# Patient Record
Sex: MALE | Race: WHITE | NOT HISPANIC OR LATINO | URBAN - METROPOLITAN AREA
[De-identification: names, ages, dates, MRNs, and addresses within clinical notes are randomized per-mention and may not be internally consistent; named-entity substitution may affect disease eponyms.]

---

## 2023-06-19 LAB
ABO GROUP (TYPE) IN BLOOD: NORMAL
ACTIVATED PARTIAL THROMBOPLASTIN TIME IN PPP BY COAGULATION ASSAY: 30 SEC (ref 26–39)
ANION GAP IN SER/PLAS: 11 MMOL/L (ref 10–20)
ANTIBODY SCREEN: NORMAL
BASOPHILS (10*3/UL) IN BLOOD BY AUTOMATED COUNT: 0.03 X10E9/L (ref 0–0.1)
BASOPHILS/100 LEUKOCYTES IN BLOOD BY AUTOMATED COUNT: 0.5 % (ref 0–2)
CALCIUM (MG/DL) IN SER/PLAS: 9.2 MG/DL (ref 8.6–10.3)
CARBON DIOXIDE, TOTAL (MMOL/L) IN SER/PLAS: 26 MMOL/L (ref 21–32)
CHLORIDE (MMOL/L) IN SER/PLAS: 101 MMOL/L (ref 98–107)
CREATININE (MG/DL) IN SER/PLAS: 0.95 MG/DL (ref 0.5–1.3)
EOSINOPHILS (10*3/UL) IN BLOOD BY AUTOMATED COUNT: 0.13 X10E9/L (ref 0–0.7)
EOSINOPHILS/100 LEUKOCYTES IN BLOOD BY AUTOMATED COUNT: 2 % (ref 0–6)
ERYTHROCYTE DISTRIBUTION WIDTH (RATIO) BY AUTOMATED COUNT: 12.6 % (ref 11.5–14.5)
ERYTHROCYTE MEAN CORPUSCULAR HEMOGLOBIN CONCENTRATION (G/DL) BY AUTOMATED: 33.5 G/DL (ref 32–36)
ERYTHROCYTE MEAN CORPUSCULAR VOLUME (FL) BY AUTOMATED COUNT: 89 FL (ref 80–100)
ERYTHROCYTES (10*6/UL) IN BLOOD BY AUTOMATED COUNT: 4.43 X10E12/L (ref 4.5–5.9)
GFR MALE: 87 ML/MIN/1.73M2
GLUCOSE (MG/DL) IN SER/PLAS: 90 MG/DL (ref 74–99)
HEMATOCRIT (%) IN BLOOD BY AUTOMATED COUNT: 39.4 % (ref 41–52)
HEMOGLOBIN (G/DL) IN BLOOD: 13.2 G/DL (ref 13.5–17.5)
IMMATURE GRANULOCYTES/100 LEUKOCYTES IN BLOOD BY AUTOMATED COUNT: 0.3 % (ref 0–0.9)
INR IN PPP BY COAGULATION ASSAY: 0.9 (ref 0.9–1.1)
LEUKOCYTES (10*3/UL) IN BLOOD BY AUTOMATED COUNT: 6.4 X10E9/L (ref 4.4–11.3)
LYMPHOCYTES (10*3/UL) IN BLOOD BY AUTOMATED COUNT: 2.67 X10E9/L (ref 1.2–4.8)
LYMPHOCYTES/100 LEUKOCYTES IN BLOOD BY AUTOMATED COUNT: 42 % (ref 13–44)
MONOCYTES (10*3/UL) IN BLOOD BY AUTOMATED COUNT: 0.62 X10E9/L (ref 0.1–1)
MONOCYTES/100 LEUKOCYTES IN BLOOD BY AUTOMATED COUNT: 9.7 % (ref 2–10)
NEUTROPHILS (10*3/UL) IN BLOOD BY AUTOMATED COUNT: 2.89 X10E9/L (ref 1.2–7.7)
NEUTROPHILS/100 LEUKOCYTES IN BLOOD BY AUTOMATED COUNT: 45.5 % (ref 40–80)
NRBC (PER 100 WBCS) BY AUTOMATED COUNT: 0 /100 WBC (ref 0–0)
PLATELETS (10*3/UL) IN BLOOD AUTOMATED COUNT: 242 X10E9/L (ref 150–450)
POTASSIUM (MMOL/L) IN SER/PLAS: 4 MMOL/L (ref 3.5–5.3)
PROTHROMBIN TIME (PT) IN PPP BY COAGULATION ASSAY: 10.9 SEC (ref 9.8–13.4)
RH FACTOR: NORMAL
SODIUM (MMOL/L) IN SER/PLAS: 134 MMOL/L (ref 136–145)
UREA NITROGEN (MG/DL) IN SER/PLAS: 19 MG/DL (ref 6–23)

## 2023-06-26 ENCOUNTER — HOSPITAL ENCOUNTER (OUTPATIENT)
Dept: DATA CONVERSION | Facility: HOSPITAL | Age: 68
End: 2023-06-27
Attending: ORTHOPAEDIC SURGERY | Admitting: ORTHOPAEDIC SURGERY

## 2023-06-26 DIAGNOSIS — E78.5 HYPERLIPIDEMIA, UNSPECIFIED: ICD-10-CM

## 2023-06-26 DIAGNOSIS — M17.12 UNILATERAL PRIMARY OSTEOARTHRITIS, LEFT KNEE: ICD-10-CM

## 2023-06-26 DIAGNOSIS — M25.762 OSTEOPHYTE, LEFT KNEE: ICD-10-CM

## 2023-06-26 DIAGNOSIS — J45.909 UNSPECIFIED ASTHMA, UNCOMPLICATED (HHS-HCC): ICD-10-CM

## 2023-06-27 LAB
ANION GAP IN SER/PLAS: 15 MMOL/L (ref 10–20)
BASOPHILS (10*3/UL) IN BLOOD BY AUTOMATED COUNT: 0.02 X10E9/L (ref 0–0.1)
BASOPHILS/100 LEUKOCYTES IN BLOOD BY AUTOMATED COUNT: 0.1 % (ref 0–2)
CALCIUM (MG/DL) IN SER/PLAS: 8.6 MG/DL (ref 8.6–10.3)
CARBON DIOXIDE, TOTAL (MMOL/L) IN SER/PLAS: 21 MMOL/L (ref 21–32)
CHLORIDE (MMOL/L) IN SER/PLAS: 105 MMOL/L (ref 98–107)
CREATININE (MG/DL) IN SER/PLAS: 0.89 MG/DL (ref 0.5–1.3)
EOSINOPHILS (10*3/UL) IN BLOOD BY AUTOMATED COUNT: 0 X10E9/L (ref 0–0.7)
EOSINOPHILS/100 LEUKOCYTES IN BLOOD BY AUTOMATED COUNT: 0 % (ref 0–6)
ERYTHROCYTE DISTRIBUTION WIDTH (RATIO) BY AUTOMATED COUNT: 12.8 % (ref 11.5–14.5)
ERYTHROCYTE MEAN CORPUSCULAR HEMOGLOBIN CONCENTRATION (G/DL) BY AUTOMATED: 34 G/DL (ref 32–36)
ERYTHROCYTE MEAN CORPUSCULAR VOLUME (FL) BY AUTOMATED COUNT: 89 FL (ref 80–100)
ERYTHROCYTES (10*6/UL) IN BLOOD BY AUTOMATED COUNT: 3.68 X10E12/L (ref 4.5–5.9)
GFR MALE: >90 ML/MIN/1.73M2
GLUCOSE (MG/DL) IN SER/PLAS: 130 MG/DL (ref 74–99)
HEMATOCRIT (%) IN BLOOD BY AUTOMATED COUNT: 32.9 % (ref 41–52)
HEMOGLOBIN (G/DL) IN BLOOD: 11.2 G/DL (ref 13.5–17.5)
IMMATURE GRANULOCYTES/100 LEUKOCYTES IN BLOOD BY AUTOMATED COUNT: 0.5 % (ref 0–0.9)
LEUKOCYTES (10*3/UL) IN BLOOD BY AUTOMATED COUNT: 16.8 X10E9/L (ref 4.4–11.3)
LYMPHOCYTES (10*3/UL) IN BLOOD BY AUTOMATED COUNT: 1.32 X10E9/L (ref 1.2–4.8)
LYMPHOCYTES/100 LEUKOCYTES IN BLOOD BY AUTOMATED COUNT: 7.9 % (ref 13–44)
MONOCYTES (10*3/UL) IN BLOOD BY AUTOMATED COUNT: 1.56 X10E9/L (ref 0.1–1)
MONOCYTES/100 LEUKOCYTES IN BLOOD BY AUTOMATED COUNT: 9.3 % (ref 2–10)
NEUTROPHILS (10*3/UL) IN BLOOD BY AUTOMATED COUNT: 13.82 X10E9/L (ref 1.2–7.7)
NEUTROPHILS/100 LEUKOCYTES IN BLOOD BY AUTOMATED COUNT: 82.2 % (ref 40–80)
NRBC (PER 100 WBCS) BY AUTOMATED COUNT: 0 /100 WBC (ref 0–0)
PLATELETS (10*3/UL) IN BLOOD AUTOMATED COUNT: 225 X10E9/L (ref 150–450)
POTASSIUM (MMOL/L) IN SER/PLAS: 4.2 MMOL/L (ref 3.5–5.3)
SODIUM (MMOL/L) IN SER/PLAS: 137 MMOL/L (ref 136–145)
UREA NITROGEN (MG/DL) IN SER/PLAS: 22 MG/DL (ref 6–23)

## 2023-06-28 LAB
ANION GAP IN SER/PLAS: NORMAL
BASOPHILS (10*3/UL) IN BLOOD BY AUTOMATED COUNT: NORMAL
BASOPHILS/100 LEUKOCYTES IN BLOOD BY AUTOMATED COUNT: NORMAL
CALCIUM (MG/DL) IN SER/PLAS: NORMAL
CARBON DIOXIDE, TOTAL (MMOL/L) IN SER/PLAS: NORMAL
CHLORIDE (MMOL/L) IN SER/PLAS: NORMAL
CREATININE (MG/DL) IN SER/PLAS: NORMAL
EOSINOPHILS (10*3/UL) IN BLOOD BY AUTOMATED COUNT: NORMAL
EOSINOPHILS/100 LEUKOCYTES IN BLOOD BY AUTOMATED COUNT: NORMAL
ERYTHROCYTE DISTRIBUTION WIDTH (RATIO) BY AUTOMATED COUNT: NORMAL
ERYTHROCYTE MEAN CORPUSCULAR HEMOGLOBIN CONCENTRATION (G/DL) BY AUTOMATED: NORMAL
ERYTHROCYTE MEAN CORPUSCULAR VOLUME (FL) BY AUTOMATED COUNT: NORMAL
ERYTHROCYTES (10*6/UL) IN BLOOD BY AUTOMATED COUNT: NORMAL
GFR FEMALE: NORMAL
GFR MALE: NORMAL
GLUCOSE (MG/DL) IN SER/PLAS: NORMAL
HEMATOCRIT (%) IN BLOOD BY AUTOMATED COUNT: NORMAL
HEMOGLOBIN (G/DL) IN BLOOD: NORMAL
IMMATURE GRANULOCYTES/100 LEUKOCYTES IN BLOOD BY AUTOMATED COUNT: NORMAL
LEUKOCYTES (10*3/UL) IN BLOOD BY AUTOMATED COUNT: NORMAL
LYMPHOCYTES (10*3/UL) IN BLOOD BY AUTOMATED COUNT: NORMAL
LYMPHOCYTES/100 LEUKOCYTES IN BLOOD BY AUTOMATED COUNT: NORMAL
MANUAL DIFFERENTIAL Y/N: NORMAL
MONOCYTES (10*3/UL) IN BLOOD BY AUTOMATED COUNT: NORMAL
MONOCYTES/100 LEUKOCYTES IN BLOOD BY AUTOMATED COUNT: NORMAL
NEUTROPHILS (10*3/UL) IN BLOOD BY AUTOMATED COUNT: NORMAL
NEUTROPHILS/100 LEUKOCYTES IN BLOOD BY AUTOMATED COUNT: NORMAL
NRBC (PER 100 WBCS) BY AUTOMATED COUNT: NORMAL
PLATELETS (10*3/UL) IN BLOOD AUTOMATED COUNT: NORMAL
POTASSIUM (MMOL/L) IN SER/PLAS: NORMAL
SODIUM (MMOL/L) IN SER/PLAS: NORMAL
UREA NITROGEN (MG/DL) IN SER/PLAS: NORMAL

## 2023-09-29 VITALS
HEIGHT: 74 IN | SYSTOLIC BLOOD PRESSURE: 116 MMHG | BODY MASS INDEX: 30.64 KG/M2 | DIASTOLIC BLOOD PRESSURE: 75 MMHG | HEART RATE: 66 BPM | WEIGHT: 238.76 LBS

## 2023-09-30 NOTE — H&P
History & Physical Reviewed:   I have reviewed the History and Physical dated:  19-Jun-2023   History and Physical reviewed and relevant findings noted. Patient examined to review pertinent physical  findings.: No significant changes   Home Medications Reviewed: no changes noted   Allergies Reviewed: no changes noted       ERAS (Enhanced Recovery After Surgery):  ·  ERAS Patient: yes   ·  CPM/PAT Utilization: yes   ·  Immunonutrition Recovery Drink Utilization: no   ·  Carbohydrate Supplement Drink Utilization: no     Consent:   COVID-19 Consent:  ·  COVID-19 Risk Consent Surgeon has reviewed key risks related to the risk of nina COVID-19 and if they contract COVID-19 what the risks are.       Electronic Signatures:  Dg Ramos)  (Signed 26-Jun-2023 07:45)   Authored: History & Physical Reviewed, ERAS, Consent,  Note Completion      Last Updated: 26-Jun-2023 07:45 by gD Ramos)

## 2023-09-30 NOTE — PROGRESS NOTES
Service: Orthopaedics     Subjective Data:   JING HORTON is a 68 year old Male who is Hospital Day # 2 and POD #1 for Left total knee arthroplasty.    Additional Information:    Mr. Horton is voiding without issues. He complains of moderate left knee discomfort this morning. He denies chest pain, shortness of breath, nausea.  He is looking  forward to getting up and working with therapy.    Objective Data:     Objective Information:      T   P  R  BP   MAP  SpO2   Value  36.4  82  18  125/50   80  95%  Date/Time 6/27 8:00 6/27 8:00 6/27 8:00 6/27 8:00  6/27 8:00 6/27 8:46  Range  (36C - 36.4C )  (68 - 82 )  (18 - 18 )  (125 - 152 )/ (50 - 90 )  (80 - 106 )  (90% - 96% )   As of 26-Jun-2023 15:13:00, patient is on 0 L/min of oxygen via room air.      Pain reported at 6/27 8:38: 5 = Moderate    Physical Exam by System:    Constitutional: Well developed, awake/alert, no distress,  alert and cooperative   Eyes: EOMI, clear sclera   ENMT: mucous membranes moist, no lesions seen   Respiratory/Thorax: Patent airways, with good chest  expansion, thorax symmetric   Musculoskeletal: left knee dressing dry and intact.    Bilateral lower extremities distally with intact dorsiflexion/plantarflexion/EHL.  DP palpable 2+/2   Neurological: alert,  intact senses   Lymphatic: No significant lymphadenopathy   Psychological: Appropriate mood and behavior     Medication:    Medications:          Continuous Medications       --------------------------------    1. Lactated Ringers Infusion:  1000  mL  IntraVenous  <Continuous>         Scheduled Medications       --------------------------------    1. Acetaminophen:  975  mg  Oral  Every 8 Hours    2. Aspirin Enteric Coated:  81  mg  Oral  2 Times a Day    3. Atorvastatin:  20  mg  Oral  Every Night    4. Cyclobenzaprine:  10  mg  Oral  2 Times a Day    5. Docusate:  100  mg  Oral  2 Times a Day    6. Ketorolac Injectable:  30  mg  IntraVenous Push  Every 6 Hours    7. Melatonin:   3  mg  Oral  Daily 1800    8. Multivitamin with Minerals:  1  tablet(s)  Oral  Daily    9. Polyethylene Glycol:  17  gram(s)  Oral  Daily    10. Potassium Chloride Extended Release:  20  mEq  Oral  Daily    11. Vitamin B Complex with C:  1  each  Oral  Daily         PRN Medications       --------------------------------    1. Albuterol 2.5 mg/ 3 mL Nebulizer Soln:  3  mL  Inhalation  Every 6 Hours    2. Bisacodyl Enteric Coated:  10  mg  Oral  Daily    3. diphenhydrAMINE:  25  mg  Oral  Every 6 Hours    4. Magnesium Hydroxide -Al Hydrox -Simethicone Oral Liquid:  30  mL  Oral  Every 6 Hours    5. Morphine Injectable:  2  mg  IntraVenous Push  Every 2 Hours    6. Ondansetron Injectable:  4  mg  IntraVenous Push  Every 6 Hours    7. oxyCODONE Immediate Release:  5  mg  Oral  Every 4 Hours    8. oxyCODONE Immediate Release:  10  mg  Oral  Every 4 Hours        Recent Lab Results:    Results:    CBC: 6/27/2023 07:02              \     Hgb     /                              \     11.2 L    /  WBC  ----------------  Plt               16.8 H    ----------------    225              /     Hct     \                              /     32.9 L    \            RBC: 3.68 L    MCV: 89     Neutrophil %: 82.2      BMP: 6/27/2023 07:02  NA+        Cl-     BUN  /                         137    105    22  /  --------------------------------  Glucose                ---------------------------  130 H    K+     HCO3-   Creat \                         4.2  21    0.89  \  Calcium : 8.6     Anion Gap : 15      Assessment and Plan:        Admitting Dx:   Primary osteoarthritis of left knee: Entered Date: 26-Jun-2023  11:32       Additional Dx:   Status post total left knee replacement: Entered Date: 26-Jun-2023  14:04    Code Status:  ·  Code Status Full Code       Impression 1: Primary osteoarthritis of left knee   Plan for Impression 1: Postop day #1 status post  left total knee arthroplasty  PT/OT, weightbearing as tolerated left  lower extremity  Pain regimen: Good pain control with intermittent oxycodone and Toradol overnight  Bowel regimen: Colace and MiraLAX; Dulcolax  Hemoglobin: 11.2  VTE prophylaxis: Aspirin 81 mg twice daily and SCDs   disposition: When appropriate, will discharge home with home health care, face-to-face is completed  Follow-up with Dr. Ramos as directed       Electronic Signatures:  Amber Samaniego (PAC)  (Signed 27-Jun-2023 10:11)   Authored: Service, Subjective Data, Objective Data, Assessment  and Plan, Note Completion      Last Updated: 27-Jun-2023 10:11 by Amber Samaniego (PAC)

## 2023-09-30 NOTE — DISCHARGE SUMMARY
Send Summary:   Discharge Summary Providers:  Provider Role Provider Name   · Attending Dg Ramos   · Referring Dg Ramos   · Primary Paty Rubin       Note Recipients: Dg Ramos MD Newman, [place], MD - [phone] []       Discharge:    Summary:   Admission Date: .26-Jun-2023 07:13:00   Discharge Date: 27-Jun-2023   Attending Physician at Discharge: Dg Ramos   Admission Reason: Left knee osteoarthritis   Final Discharge Diagnoses: Left knee osteoarthritis  now s/p left TKA   Procedures: Date: 26-Jun-2023 13:56:00  Procedure Name: Left total knee arthroplasty   Condition at Discharge: Satisfactory   Disposition at Discharge: Home Health Care - New   Vital Signs:        T   P  R  BP   MAP  SpO2   Value  36.4  82  18  125/50   80  95%  Date/Time 6/27 8:00 6/27 8:00 6/27 8:00 6/27 8:00  6/27 8:00 6/27 8:46  Range  (36C - 36.4C )  (68 - 82 )  (18 - 18 )  (125 - 152 )/ (50 - 90 )  (80 - 106 )  (90% - 96% )   As of 26-Jun-2023 15:13:00, patient is on 0 L/min of oxygen via room air.    Date:            Weight/Scale Type:  Height:   26-Jun-2023 15:29  108.3  kg / bed  187.9  cm  Hospital Course:    Gurpreet is a pleasant 68  year-old male with a history of severe left knee osteoarthritis.  Patient was admitted on elective basis for  left total knee arthroplasty on 6/26/2 3.  Procedure was performed under spinal anesthesia with IV Ancef and Vancomycin  for jameel-operative antibiotics.  Please see operative note for further details of this procedure.  Patient recovered in the PACU before transfer to a regular nursing floor.  Patient was started on a multimodal pain protocol utilizing narcotic and non-narcotic  pain medication. He had ASA 81mg by mouth twice a day and SCDs for  DVT prophylaxis.  He did exceptionally well and by the afternoon of  POD #1 was mobilizing well with physical therapy, able to have ~9 0 degrees of flexion, tolerate a regular diet, void on his own  volition, walk several hundred feet with minimal difficulty, navigate stairs, and did not have any abnormalities with his vital signs.  Physical therapy recommended continued recovery at home  with home care services.  He was medically appropriate for discharge home on the afternoon of POD #1.  His dressing was clean, dry and  intact with no staining or saturation.  No concern for DVT.  He will keep the Mepilex dressing in place for a week.  He will complete a total of 4 weeks of ASA 81mg by  mouth twice a day for DVT pp x.  He was discharged with prescriptions for Ibuprofen, Tylenol, Cyclobenzaprine and  Oxycodone for pain.  He will follow-up with Dr. Ramos in 2-3 weeks for outpatient follow-up.      Discharge Information:    and Continuing Care:   Lab Results - Pending:    None  Radiology Results - Pending: None   Discharge Instructions:    Activity:           activity as tolerated.          May shower..  as the bandage is waterproof          May not drive until follow-up visit.            No pushing, pulling, or lifting objects greater than 10 pounds until follow-up visit.            Weight-bearing Instructions: weight-bearing as tolerated left leg.            You can put as much weight on the left leg as you can tolerate.  Continue to use a walker with ambulation for the first 1-2 weeks and transition to a cane as you feel safe/comfortable. Keep the Mepilex bandage in place for 1 week after surgery  and then it can be removed.  After it is removed you can continue to shower, but no baths, tubs, soaks, pools or hot tubs for a month after surgery until the incision fully heals. Similarly, do not apply any lotions, creams, or ointments over the incision  for a month.  Continue to wear your compression stockings for 2-3 weeks to help with swelling.  Continue to frequently ice several hours throughout the day to help with pain and swelling.    Nutrition/Diet:           regular,  resume normal diet    Wound Care:            Wound Site:   Left knee          Wound Type:   surgical incision          Instructions:   no lotions, creams, or tub soaks    Additional Orders:           Additional Instructions:   -You will have a number of medications to help control pain after surgery:  1) You can take Extra Strength Tylenol (500mg) 2 tabs by mouth 3x/day along with Ibuprofen (600mg) 3x/day with food for the first 1-2 weeks after surgery to help with baseline pain and inflammation.    2) If you are having moderate to severe pain, there will be a Rx for Oxycodone (5mg) that can be taken 1-2 tabs every 4 hours on an as needed basis.   3) Lastly, there will be a small Rx for Cyclobenzaprine (10mg) that can be taken 2x/day to help with muscle spasms, soreness and sleep.     -Continue taking the Baby Aspirin 81mg 2x/day for the first month after surgery to minimize the risk of blood clots following surgery.     -If needed, you can take any combination of Colace (stool softener), Dulcolax or Miralax (laxative).  All of these are over-the-counter.     -The following Rxs have been prescribed to you: Oxycodone (pain medication), Cyclobenzaprine (muscle relaxer), Ibuprofen (anti-inflammatory), Tylenol, and Baby Aspirin.  All the stool softeners/laxatives are over-the-counter and can be picked up at any  local pharmacy.     You can resume all of your normal home medications and vitamins.     MEDICATION SIDE EFFECTS.  OXYCODONE: constipation, nausea, vomiting, upset stomach, drowsiness, dizziness, lightheadedness, itching, headache, blurred vision, dry mouth, sweating    -Call Dr. Ramos's office if there is any drainage after 7 days, increased redness/warmth/swelling at incision site, pain/tenderness of calf, swelling of calf that does not respond to elevation, SOB/chest pain.    -Do not visit the dentist until 3 months after the surgery date.  You will need to take an antibiotic prior to any dental procedure.  Please call [phone] and  request a prescription for antibiotics for prior to these procedures       Home Care Certification:           Home Care Agency:    [facility] [phone]          Skilled Disciplines Ordered:   PT,  OT    Home Care Services:           Home Care Skilled Service:   Rehab (PT/OT/SP eval and treat),  wound care    Follow Up Appointments:    Follow-Up Appointment 01:           Physician/Dept/Service:   Dr. Dg Ramos          Reason for Referral:   1st follow-up appointment, wound assessment and Xrays          Call to Schedule in:   3 weeks          Location:   [facility]          Phone Number:   [phone]    Discharge Medications: Home Medication   CoQ10 100 mg oral capsule - 1 cap(s) orally once a day  Centrum Silver Men's oral tablet - 1 tab(s) orally once a day  melatonin 3 mg oral tablet - 1 tab(s) orally once a day (at bedtime)  Tylenol Extra Strength 500 mg oral tablet - 2 tab(s) orally 3 times a day for the first 1-2 weeks after surgery to help with baseline pain and inflammation.   ibuprofen 600 mg oral tablet - 1 tab(s) orally 3 times a day (with meals) for the first 1-2 weeks after surgery to help with baseline pain and inflammation.   Aspirin Enteric Coated 81 mg oral delayed release tablet - 1 tab(s) orally 2 times a day for a month after surgery to minimize the risk of developing blood clots.   oxyCODONE 5 mg oral tablet - 1-2 tab(s) orally every 4 hours as needed for moderate to severe pain.   cyclobenzaprine 10 mg oral tablet - 1 tab orally 2 times a day to help with muscle spasms, cramping, soreness and pain following knee replacement.   atorvastatin 20 mg oral tablet - 1 tab(s) orally once a day (at bedtime)     PRN Medication   potassium chloride 500 mg oral tablet - 1 tab(s) orally once a day (at bedtime), As Needed for leg cramps  magnesium glycinate 100 mg oral capsule - 1 cap(s) orally once a day (at bedtime), As Needed  Albuterol  (Eqv-Proventil HFA) 90 mcg/inh inhalation aerosol - 2 puff(s) inhaled every 6 hours, As Needed     DNR Status:   ·  Code Status Code Status order at time of discharge: Full Code       Electronic Signatures:  Dg Ramos)  (Signed 27-Jun-2023 19:07)   Authored: Send Summary, Summary Content, Ongoing Care,  DNR Status, Note Completion      Last Updated: 27-Jun-2023 19:07 by Dg Ramos)

## 2023-10-02 NOTE — OP NOTE
Post Operative Note:     PreOp Diagnosis: Left knee osteoarthritis   Post-Procedure Diagnosis: Left knee osteoarthritis   Procedure: Left total knee arthroplasty   Surgeon: Dr. Dg Ramos   Resident/Fellow/Other Assistant: Ms. Dorothy Eaton  (CSFA)   Anesthesia: Spinal with pre-op adductor canal block   I.V. Fluids: 900cc crystalloid   Estimated Blood Loss (mL): 50cc   Blood Replacement: None   Specimen: no   Complications: None   Findings: Severe left knee degenerative changes and  varus deformity   Patient Returned To/Condition: PACU in stable condition   Urine Output: n/a   Drains and/or Catheters: none   Tourniquet Times: 82 minutes 250mmHg     Attestation:   Note Completion:  Attending Attestation I performed the procedure without a resident   Comments/ Additional Findings    IMPLANTS:  1. DePuy Attune Por[place]oat size #8 LEFT femoral posterior stabilized cementless component.   2. DePuy Attune Porocoat size #9 press-fit rotating platform tibial component.   3. DePuy Attune 8mm anti-oxidized posterior stabilized rotating platform tibial polyethylene insert.     INDICATIONS:    The patient is an incredibly nice 68-year-old male with longstanding history of left knee pain and osteoarthritis with severely impacting his quality of life and ability to perform day-to-day activities.  He has exhausted an extensive course of conservative  treatment and is still having severe pain that is affecting his quality of life.  He was indicated for a LEFT total knee replacement.  Please see my office note for further in-depth details of the history, conservative treatments, physical exam, informed  consent and discussion process.  All necessary documentation was performed and signed, given the possible risk of nina exposure to the COVID-19 virus. We discussed the pros and cons of press-fit vs cemented total knee components given his age and  bone quality based on Xrays.  Will plan on using press-fit femoral  and tibial components unless there are intra-operative findings that I feel that cementless components would be at risk of poor outcome or failure of ingrowth.     OPERATIVE DETAILS:    The patient was met in the preoperative holding area.  The LEFT knee was marked.  We reviewed and signed his informed consent.  He had an adductor canal block performed by the anesthesia pain services.  He was transported to the OR and transferred from  the hospital bed to the operating room table.  A time-out was performed, which confirmed the correct patient, procedure, and laterality.  His x-rays were on the PACS viewing monitor.  A spinal anesthesia was performed and he was supine and all bony prominences  were well padded.  A bump was placed under the right hip.  Tourniquet was placed high on the LEFT lower extremity.  We prepped and draped the operative extremity in the usual sterile fashion.  A preincision pause confirmed that weight-based IV Ancef,  Vancomycin and oral tranexamic acid had been administered and documented. We exsanguinated the leg with an Esmarch bandage and inflated the tourniquet to 250mmHg.     We began by making a longitudinal incision over the anterior aspect of the knee.  We incised through the skin and subcutaneous  tissue.  We had excellent hemostasis using the Bovie electrocautery.  We came down the level of fascia and raised small full-thickness flap to identify the medial and lateral border of the patella as well as the  VMO.  We made a medial parapatellar arthrotomy.   We had excellent hemostasis.  We made a standard medial release, mobilized the extensor mechanism, and excised the majority of the synovium throughout the suprapatellar pouch, medial and lateral gutter.  Then, we were able to deejay the patella and flex  the knee to 90 degrees.  We excised the majority of the infrapatellar fat pad.  All osteophytes over the distal femur and proximal tibia were excised.  There were minimal degenerative  changes over the patella.  We excised the ACL and PCL.  We gained access  to the distal femur by using the step drill and inserted the intramedullary guide set at 5-degree valgus cut angle and 9mm of distal resection.  The guide was pinned in place.  We made the distal femoral resection.  We used the AP sizing guide and it  was sized to a #8 component and pinned in appropriate external rotation approximately 3 degrees from the transepicondylar axis and perpendicular to Whitesides line.  We then pinned the 4-in-1 cutting block in place and confirmed an demarco wing that we  would not notch the distal femur.  We then made the cuts in sequence.  We then pinned the posterior stabilized box cutting guide in place and was slightly lateralized.  We made the box cut with the reciprocating saw.      We then subluxed the tibia anteriorly with a blunt Hohmann and had excellent exposure to the proximal tibia.  We recessed the remaining PCL over the insertion on the proximal tibia.  We then pinned the extramedullary tibial cutting guide in place centered  over the medial third of the tibial tubercle in line with the  center of the ankle joint distally.  It was pinned roughly 3-degree posterior slope.  We then used a stylus to confirm taking approximately 1mm below the more affected medial compartment.   We  then made the cuts in sequence and then confirmed that it was in neutral coronal alignment and appropriate posterior slope using a  block and a gaurav.  We then placed a laminar  and sequence excised the medial meniscus, lateral meniscus, and inflamed synovium  over the posterior capsule and posterior condylar osteophytes.  We had excellent hemostasis over the posterior capsule.  We then  subluxed the tibia anteriorly and it was sized to a #9 component.  It was  pinned in appropriate external rotation roughly centered over the medial third of the tibial tubercle.  We removed any remaining osteophytes over the medial tibial  plateau.  We  then used the  press-fit Boss reamer and keel punch and drilled for the press-fit lugs.  We then placed the size #8 femoral component in place. We got optimal stability using a 8mm posterior stabilized rotating platform polyethylene trial.  There was excellent tension  over the MCL and was nicely stable to varus and valgus stress at 0, 30, 90 degrees.  There was smooth patellofemoral tracking from 0 to 130 degrees.  There was a couple millimeters of anterior tibial subluxation with the knee at 90 degrees and clamping  down the arthrotomy with 2 towel clips.  The patient had a very thick, broad patella.  There were minimal degenerative changes over the patella so we ultimately did not resurface the patella. We drilled the lug holes in the  distal femur.  There was excellent  bony apposition and bone stock, so we elected to use the press-fit components.  We were happy with our overall stability in the coronal and sagittal plane, patellofemoral tracking and component orientation.  We used these as our final implant trials.   We then removed all of the trials, gently irrigated the bony surfaces and removed any bony debris.  We then impacted the size #9 press-fit rotating platform tibial component and there was excellent bony apposition and excellent rotational stability.   Similarly, we impacted the size #8 posterior stabilized press-fit component.  There was excellent bony apposition overall cut surfaces.  We then inserted the 8mm posterior stabilized rotating platform polyethylene insert and reduced the knee. The knee  was very nicely stable to varus/valgus stress, the MCL was nicely taut, the was no recurvatum, and there was excellent patellofemoral tracking.  We irrigated with a total of 3L of normal saline followed by 1 bottle of Irrisept solution.  We made sure  there was no retained bony debris.  We made sure that there was excellent hemostasis prior to closure.  We injected the capsule and  periosteum with a total of 100mL of combined Ropivacaine, Epinephrine, Clonidine and Ketorolac.  We then closed the arthrotomy  with a combination of #1 Vicryl in a figure-of-eight fashion and this was supplemented with a #2 Stratafix running, barbed suture.  We then closed the subcutaneous tissue with 2-0 Vicryl. The skin was closed with a running subcuticular 3-0 Monocryl followed  by Exofin and a silver impregnated Mepilex dressing.  The drapes were removed.  I was present and scrubbed for all critical aspect of the procedure.  The patient had palpable pulses.  He was awoken from anesthesia and transferred to the recovery room  in stable condition.       Dg Ramos MD        Electronic Signatures:  Dg Ramos)  (Signed 26-Jun-2023 14:02)   Authored: Post Operative Note, Note Completion      Last Updated: 26-Jun-2023 14:02 by Dg Ramos)

## 2024-10-04 ENCOUNTER — PREP FOR PROCEDURE (OUTPATIENT)
Dept: GASTROENTEROLOGY | Age: 69
End: 2024-10-04

## 2024-10-04 RX ORDER — SODIUM CHLORIDE 9 MG/ML
INJECTION, SOLUTION INTRAVENOUS PRN
Status: CANCELLED | OUTPATIENT
Start: 2024-10-04

## 2024-10-04 RX ORDER — SODIUM CHLORIDE 9 MG/ML
INJECTION, SOLUTION INTRAVENOUS CONTINUOUS
Status: CANCELLED | OUTPATIENT
Start: 2024-10-04

## 2024-10-04 RX ORDER — SODIUM CHLORIDE 0.9 % (FLUSH) 0.9 %
5-40 SYRINGE (ML) INJECTION PRN
Status: CANCELLED | OUTPATIENT
Start: 2024-10-04

## 2024-10-04 RX ORDER — SODIUM CHLORIDE 0.9 % (FLUSH) 0.9 %
5-40 SYRINGE (ML) INJECTION EVERY 12 HOURS SCHEDULED
Status: CANCELLED | OUTPATIENT
Start: 2024-10-04

## 2024-10-22 RX ORDER — POLYETHYLENE GLYCOL 3350, SODIUM CHLORIDE, SODIUM BICARBONATE, POTASSIUM CHLORIDE 420; 11.2; 5.72; 1.48 G/4L; G/4L; G/4L; G/4L
4000 POWDER, FOR SOLUTION ORAL ONCE
Qty: 4000 ML | Refills: 0 | Status: SHIPPED | OUTPATIENT
Start: 2024-10-22 | End: 2024-10-22

## 2024-11-07 ENCOUNTER — ANESTHESIA (OUTPATIENT)
Dept: OPERATING ROOM | Age: 69
End: 2024-11-07
Payer: MEDICARE

## 2024-11-07 ENCOUNTER — HOSPITAL ENCOUNTER (OUTPATIENT)
Age: 69
Setting detail: OUTPATIENT SURGERY
Discharge: HOME | End: 2024-11-07
Attending: INTERNAL MEDICINE | Admitting: INTERNAL MEDICINE
Payer: MEDICARE

## 2024-11-07 ENCOUNTER — ANESTHESIA EVENT (OUTPATIENT)
Dept: OPERATING ROOM | Age: 69
End: 2024-11-07
Payer: MEDICARE

## 2024-11-07 VITALS
DIASTOLIC BLOOD PRESSURE: 91 MMHG | OXYGEN SATURATION: 95 % | SYSTOLIC BLOOD PRESSURE: 130 MMHG | WEIGHT: 230 LBS | RESPIRATION RATE: 12 BRPM | TEMPERATURE: 96.8 F | BODY MASS INDEX: 29.52 KG/M2 | HEIGHT: 74 IN | HEART RATE: 70 BPM

## 2024-11-07 DIAGNOSIS — Z12.11 COLON CANCER SCREENING: ICD-10-CM

## 2024-11-07 PROBLEM — K63.5 POLYP OF COLON: Status: ACTIVE | Noted: 2024-11-07

## 2024-11-07 PROCEDURE — 3700000001 HC ADD 15 MINUTES (ANESTHESIA): Performed by: INTERNAL MEDICINE

## 2024-11-07 PROCEDURE — 6360000002 HC RX W HCPCS: Performed by: STUDENT IN AN ORGANIZED HEALTH CARE EDUCATION/TRAINING PROGRAM

## 2024-11-07 PROCEDURE — 7100000010 HC PHASE II RECOVERY - FIRST 15 MIN: Performed by: INTERNAL MEDICINE

## 2024-11-07 PROCEDURE — 2709999900 HC NON-CHARGEABLE SUPPLY: Performed by: INTERNAL MEDICINE

## 2024-11-07 PROCEDURE — 2580000003 HC RX 258

## 2024-11-07 PROCEDURE — 45380 COLONOSCOPY AND BIOPSY: CPT | Performed by: INTERNAL MEDICINE

## 2024-11-07 PROCEDURE — 88305 TISSUE EXAM BY PATHOLOGIST: CPT

## 2024-11-07 PROCEDURE — 3700000000 HC ANESTHESIA ATTENDED CARE: Performed by: INTERNAL MEDICINE

## 2024-11-07 PROCEDURE — 3609027000 HC COLONOSCOPY: Performed by: INTERNAL MEDICINE

## 2024-11-07 PROCEDURE — 7100000011 HC PHASE II RECOVERY - ADDTL 15 MIN: Performed by: INTERNAL MEDICINE

## 2024-11-07 RX ORDER — LABETALOL HYDROCHLORIDE 5 MG/ML
10 INJECTION, SOLUTION INTRAVENOUS
Status: DISCONTINUED | OUTPATIENT
Start: 2024-11-07 | End: 2024-11-07 | Stop reason: HOSPADM

## 2024-11-07 RX ORDER — SODIUM CHLORIDE, SODIUM LACTATE, POTASSIUM CHLORIDE, CALCIUM CHLORIDE 600; 310; 30; 20 MG/100ML; MG/100ML; MG/100ML; MG/100ML
INJECTION, SOLUTION INTRAVENOUS CONTINUOUS
Status: DISCONTINUED | OUTPATIENT
Start: 2024-11-07 | End: 2024-11-07 | Stop reason: HOSPADM

## 2024-11-07 RX ORDER — SODIUM CHLORIDE 9 MG/ML
INJECTION, SOLUTION INTRAVENOUS PRN
Status: DISCONTINUED | OUTPATIENT
Start: 2024-11-07 | End: 2024-11-07 | Stop reason: HOSPADM

## 2024-11-07 RX ORDER — DIPHENHYDRAMINE HYDROCHLORIDE 50 MG/ML
12.5 INJECTION INTRAMUSCULAR; INTRAVENOUS
Status: DISCONTINUED | OUTPATIENT
Start: 2024-11-07 | End: 2024-11-07 | Stop reason: HOSPADM

## 2024-11-07 RX ORDER — SODIUM CHLORIDE, SODIUM LACTATE, POTASSIUM CHLORIDE, CALCIUM CHLORIDE 600; 310; 30; 20 MG/100ML; MG/100ML; MG/100ML; MG/100ML
INJECTION, SOLUTION INTRAVENOUS
Status: COMPLETED
Start: 2024-11-07 | End: 2024-11-07

## 2024-11-07 RX ORDER — SODIUM CHLORIDE 0.9 % (FLUSH) 0.9 %
5-40 SYRINGE (ML) INJECTION PRN
Status: DISCONTINUED | OUTPATIENT
Start: 2024-11-07 | End: 2024-11-07 | Stop reason: HOSPADM

## 2024-11-07 RX ORDER — FENTANYL CITRATE 0.05 MG/ML
25 INJECTION, SOLUTION INTRAMUSCULAR; INTRAVENOUS EVERY 5 MIN PRN
Status: DISCONTINUED | OUTPATIENT
Start: 2024-11-07 | End: 2024-11-07 | Stop reason: HOSPADM

## 2024-11-07 RX ORDER — HYDRALAZINE HYDROCHLORIDE 20 MG/ML
10 INJECTION INTRAMUSCULAR; INTRAVENOUS
Status: DISCONTINUED | OUTPATIENT
Start: 2024-11-07 | End: 2024-11-07 | Stop reason: HOSPADM

## 2024-11-07 RX ORDER — ONDANSETRON 2 MG/ML
4 INJECTION INTRAMUSCULAR; INTRAVENOUS
Status: DISCONTINUED | OUTPATIENT
Start: 2024-11-07 | End: 2024-11-07 | Stop reason: HOSPADM

## 2024-11-07 RX ORDER — OXYCODONE HYDROCHLORIDE 5 MG/1
5 TABLET ORAL PRN
Status: DISCONTINUED | OUTPATIENT
Start: 2024-11-07 | End: 2024-11-07 | Stop reason: HOSPADM

## 2024-11-07 RX ORDER — MEPERIDINE HYDROCHLORIDE 25 MG/ML
12.5 INJECTION INTRAMUSCULAR; INTRAVENOUS; SUBCUTANEOUS EVERY 5 MIN PRN
Status: DISCONTINUED | OUTPATIENT
Start: 2024-11-07 | End: 2024-11-07 | Stop reason: HOSPADM

## 2024-11-07 RX ORDER — ATORVASTATIN CALCIUM 20 MG/1
20 TABLET, FILM COATED ORAL DAILY
COMMUNITY

## 2024-11-07 RX ORDER — OXYCODONE HYDROCHLORIDE 5 MG/1
10 TABLET ORAL PRN
Status: DISCONTINUED | OUTPATIENT
Start: 2024-11-07 | End: 2024-11-07 | Stop reason: HOSPADM

## 2024-11-07 RX ORDER — SODIUM CHLORIDE 0.9 % (FLUSH) 0.9 %
5-40 SYRINGE (ML) INJECTION EVERY 12 HOURS SCHEDULED
Status: DISCONTINUED | OUTPATIENT
Start: 2024-11-07 | End: 2024-11-07 | Stop reason: HOSPADM

## 2024-11-07 RX ORDER — SODIUM CHLORIDE 9 MG/ML
INJECTION, SOLUTION INTRAVENOUS CONTINUOUS
Status: DISCONTINUED | OUTPATIENT
Start: 2024-11-07 | End: 2024-11-07 | Stop reason: RX

## 2024-11-07 RX ORDER — PROCHLORPERAZINE EDISYLATE 5 MG/ML
5 INJECTION INTRAMUSCULAR; INTRAVENOUS
Status: DISCONTINUED | OUTPATIENT
Start: 2024-11-07 | End: 2024-11-07 | Stop reason: HOSPADM

## 2024-11-07 RX ORDER — FENTANYL CITRATE 0.05 MG/ML
50 INJECTION, SOLUTION INTRAMUSCULAR; INTRAVENOUS EVERY 5 MIN PRN
Status: DISCONTINUED | OUTPATIENT
Start: 2024-11-07 | End: 2024-11-07 | Stop reason: HOSPADM

## 2024-11-07 RX ORDER — NALOXONE HYDROCHLORIDE 0.4 MG/ML
INJECTION, SOLUTION INTRAMUSCULAR; INTRAVENOUS; SUBCUTANEOUS PRN
Status: DISCONTINUED | OUTPATIENT
Start: 2024-11-07 | End: 2024-11-07 | Stop reason: HOSPADM

## 2024-11-07 RX ORDER — PROPOFOL 10 MG/ML
INJECTION, EMULSION INTRAVENOUS
Status: DISCONTINUED | OUTPATIENT
Start: 2024-11-07 | End: 2024-11-07 | Stop reason: SDUPTHER

## 2024-11-07 RX ADMIN — PROPOFOL 120 MG: 10 INJECTION, EMULSION INTRAVENOUS at 09:57

## 2024-11-07 RX ADMIN — SODIUM CHLORIDE, SODIUM LACTATE, POTASSIUM CHLORIDE, CALCIUM CHLORIDE: 600; 310; 30; 20 INJECTION, SOLUTION INTRAVENOUS at 08:42

## 2024-11-07 RX ADMIN — SODIUM CHLORIDE, POTASSIUM CHLORIDE, SODIUM LACTATE AND CALCIUM CHLORIDE: 600; 310; 30; 20 INJECTION, SOLUTION INTRAVENOUS at 08:42

## 2024-11-07 RX ADMIN — PROPOFOL 120 MCG/KG/MIN: 10 INJECTION, EMULSION INTRAVENOUS at 09:58

## 2024-11-07 ASSESSMENT — PAIN - FUNCTIONAL ASSESSMENT: PAIN_FUNCTIONAL_ASSESSMENT: NONE - DENIES PAIN

## 2024-11-07 NOTE — ANESTHESIA PRE PROCEDURE
Department of Anesthesiology  Preprocedure Note       Name:  Vinnie Rodriguez   Age:  69 y.o.  :  1955                                          MRN:  [id number]         Date:  2024      Surgeon: Surgeon(s):  Anitra Rogers MD    Procedure: Procedure(s):  COLORECTAL CANCER SCREENING, HIGH RISK    Medications prior to admission:   Prior to Admission medications    Medication Sig Start Date End Date Taking? Authorizing Provider   atorvastatin (LIPITOR) 20 MG tablet Take 1 tablet by mouth daily   Yes Provider, MD Jannette       Current medications:    Current Facility-Administered Medications   Medication Dose Route Frequency Provider Last Rate Last Admin    lactated ringers infusion   IntraVENous Continuous Anitra Rogers MD        sodium chloride flush 0.9 % injection 5-40 mL  5-40 mL IntraVENous 2 times per day Anitra Rogers MD        sodium chloride flush 0.9 % injection 5-40 mL  5-40 mL IntraVENous PRN Anitra Rogers MD        0.9 % sodium chloride infusion   IntraVENous PRN Anitra Rogers MD        lactated ringers infusion                Allergies:    Allergies   Allergen Reactions    Pcn [Penicillins]     Sulfa Antibiotics        Problem List:  There is no problem list on file for this patient.      Past Medical History:        Diagnosis Date    Hyperlipidemia     Ulcerative colitis (HCC)        Past Surgical History:        Procedure Laterality Date    JOINT REPLACEMENT         Social History:    Social History     Tobacco Use    Smoking status: Never    Smokeless tobacco: Never   Substance Use Topics    Alcohol use: Not on file                                Counseling given: Not Answered      Vital Signs (Current):   Vitals:    24 0838   BP: 139/81   Pulse: 74   Resp: 16   Temp: 96.8 °F (36 °C)   TempSrc: Temporal   SpO2: 97%   Weight: 104.3 kg (230 lb)   Height: 1.88 m (6' 2\")                                              BP Readings from Last 3 Encounters:   24 139/81

## 2024-11-07 NOTE — ANESTHESIA POSTPROCEDURE EVALUATION
Department of Anesthesiology  Postprocedure Note    Patient: Vinnie Rodriguez  MRN: [id number]  YOB: 1955  Date of evaluation: 11/7/2024    Procedure Summary       Date: 11/07/24 Room / Location: [address]    Anesthesia Start: 0951 Anesthesia Stop: 1019    Procedure: COLORECTAL CANCER SCREENING, HIGH RISK (Rectum) Diagnosis:       Colon cancer screening      (Colon cancer screening [Z12.11])    Surgeons: Anitra Rogers MD Responsible Provider: Martinez Guallpa MD    Anesthesia Type: MAC ASA Status: 2            Anesthesia Type: No value filed.    Talia Phase I: Talia Score: 10    Talia Phase II:      Anesthesia Post Evaluation    Patient location during evaluation: bedside  Patient participation: complete - patient participated  Level of consciousness: awake and sleepy but conscious  Airway patency: patent  Nausea & Vomiting: no nausea and no vomiting  Cardiovascular status: blood pressure returned to baseline and hemodynamically stable  Respiratory status: acceptable  Hydration status: euvolemic  Pain management: adequate    No notable events documented.

## 2024-11-07 NOTE — H&P
Patient Name: Vinnie Rodriguez  : 1955  MRN: [id number]  DATE: 24      ENDOSCOPY  History and Physical    Procedure:    [] Diagnostic Colonoscopy       [x] Screening Colonoscopy  [] EGD      [] ERCP      [] EUS       [] Other    [x] Previous office notes/History and Physical reviewed from the patients chart. Please see EMR for further details of HPI. I have examined the patient's status immediately prior to the procedure and:      Indications/HPI:    []Abdominal Pain   []Cancer- GI/Lung  []Fhx of colon CA/polyps  []History of Polyps   []Schafer’s   []Melena  []Abnormal Imaging   []Dysphagia    []Persistent Pneumonia  []Anemia   []Food Impaction  []History of Polyps  []GI Bleed   []Pulmonary nodule/Mass  []Change in bowel habits  []Heartburn/Reflux  []Rectal Bleed (BRBPR)  []Chest Pain - Non Cardiac  []Heme (+) Stool  []Ulcers  []Constipation   []Hemoptysis   []Varices  []Diarrhea   []Hypoxemia  []Nausea/Vomiting   [x]Screening   []Crohns/Colitis  []Other:    Anesthesia:   [x] MAC [] Moderate Sedation   [] General   [] None     ROS: 12 pt Review of Symptoms was negative unless mentioned above    Medications:   Prior to Admission medications    Medication Sig Start Date End Date Taking? Authorizing Provider   atorvastatin (LIPITOR) 20 MG tablet Take 1 tablet by mouth daily   Yes Provider, Historical, MD       Allergies:   Allergies   Allergen Reactions    Pcn [Penicillins]     Sulfa Antibiotics         History of allergic reaction to anesthesia:  No    Past Medical History:  No past medical history on file.    Past Surgical History:  No past surgical history on file.    Social History:       Vital Signs:   There were no vitals filed for this visit.     Physical Exam:  Cardiac:  [x]WNL  []Comments:  Pulmonary:  [x]WNL   []Comments:   Neuro/Mental Status:  [x]WNL  []Comments:  Abdominal:  [x]WNL    []Comments:  Other:   []WNL  []Comments:    Informed Consent:  The risks and benefits of the procedure have been

## 2024-12-07 PROBLEM — Z12.11 COLON CANCER SCREENING: Status: RESOLVED | Noted: 2024-11-07 | Resolved: 2024-12-07

## (undated) DEVICE — TUBE SET 96 MM 64 MM H2O PERISTALTIC STD AUX CHANNEL

## (undated) DEVICE — ADAPTER FLSH PMP FLD MGMT GI IRRIG OFP 2 DISPOSABLE

## (undated) DEVICE — 4-PORT MANIFOLD: Brand: NEPTUNE 2

## (undated) DEVICE — ENDO CARRY-ON PROCEDURE KIT INCLUDES LUBRICANT, DEFENDO OLYMPUS AIR, WATER, SUCTION, BIOPSY VALVE KIT, ENZYMATIC SPONGE, AND BASIN.: Brand: ENDO CARRY-ON PROCEDURE KIT

## (undated) DEVICE — FORCEPS BX L240CM JAW DIA2.4MM ORNG L CAP W/ NDL DISP RAD

## (undated) DEVICE — TUBING IRRIGATION 140/160/180/190 SER GI ENDOSCP SMARTCAP

## (undated) DEVICE — MEDI-VAC NON-CONDUCTIVE SUCTION TUBING: Brand: CARDINAL HEALTH

## (undated) DEVICE — BW-412T DISP COMBO CLEANING BRUSH: Brand: SINGLE USE COMBINATION CLEANING BRUSH

## (undated) DEVICE — ENDOSCOPIC TRAY TRNSPRT 20.5X16.5X4.1 IN RECYCL SUGAR PULP

## (undated) DEVICE — SUPPLEMENT DIGESTIVE H2O SOL GI-EASE

## (undated) DEVICE — TUBE ENDOSCP COLON CHANNEL